# Patient Record
Sex: FEMALE | Race: ASIAN
[De-identification: names, ages, dates, MRNs, and addresses within clinical notes are randomized per-mention and may not be internally consistent; named-entity substitution may affect disease eponyms.]

---

## 2018-06-25 ENCOUNTER — HOSPITAL ENCOUNTER (EMERGENCY)
Dept: HOSPITAL 54 - ER | Age: 53
Discharge: HOME | End: 2018-06-25
Payer: COMMERCIAL

## 2018-06-25 VITALS — BODY MASS INDEX: 27.05 KG/M2 | HEIGHT: 62 IN | WEIGHT: 147 LBS

## 2018-06-25 VITALS — SYSTOLIC BLOOD PRESSURE: 137 MMHG | DIASTOLIC BLOOD PRESSURE: 78 MMHG

## 2018-06-25 DIAGNOSIS — F41.9: Primary | ICD-10-CM

## 2018-06-25 DIAGNOSIS — I10: ICD-10-CM

## 2018-06-25 DIAGNOSIS — R07.89: ICD-10-CM

## 2018-06-25 DIAGNOSIS — Z90.710: ICD-10-CM

## 2018-06-25 LAB
APTT PPP: 23 SEC (ref 23–34)
BASOPHILS # BLD AUTO: 0 /CMM (ref 0–0.2)
BASOPHILS NFR BLD AUTO: 0.6 % (ref 0–2)
BUN SERPL-MCNC: 14 MG/DL (ref 7–18)
CALCIUM SERPL-MCNC: 9.4 MG/DL (ref 8.5–10.1)
CHLORIDE SERPL-SCNC: 103 MMOL/L (ref 98–107)
CO2 SERPL-SCNC: 28 MMOL/L (ref 21–32)
CREAT SERPL-MCNC: 0.5 MG/DL (ref 0.6–1.3)
D DIMER PPP FEU-MCNC: 0.27 MG/L(FEU (ref 0.17–0.5)
EOSINOPHIL NFR BLD AUTO: 3.6 % (ref 0–6)
GLUCOSE SERPL-MCNC: 106 MG/DL (ref 74–106)
HCT VFR BLD AUTO: 39 % (ref 33–45)
HGB BLD-MCNC: 13.7 G/DL (ref 11.5–14.8)
INR PPP: 0.88 (ref 0.85–1.15)
LYMPHOCYTES NFR BLD AUTO: 1.6 /CMM (ref 0.8–4.8)
LYMPHOCYTES NFR BLD AUTO: 31.7 % (ref 20–44)
MCHC RBC AUTO-ENTMCNC: 35 G/DL (ref 31–36)
MCV RBC AUTO: 93 FL (ref 82–100)
MONOCYTES NFR BLD AUTO: 0.5 /CMM (ref 0.1–1.3)
MONOCYTES NFR BLD AUTO: 10.1 % (ref 2–12)
NEUTROPHILS # BLD AUTO: 2.7 /CMM (ref 1.8–8.9)
NEUTROPHILS NFR BLD AUTO: 54 % (ref 43–81)
PLATELET # BLD AUTO: 285 /CMM (ref 150–450)
POTASSIUM SERPL-SCNC: 3.3 MMOL/L (ref 3.5–5.1)
RBC # BLD AUTO: 4.17 MIL/UL (ref 4–5.2)
RDW COEFFICIENT OF VARIATION: 12 (ref 11.5–15)
SODIUM SERPL-SCNC: 138 MMOL/L (ref 136–145)
TROPONIN I SERPL-MCNC: < 0.017 NG/ML (ref 0–0.06)
WBC NRBC COR # BLD AUTO: 5 K/UL (ref 4.3–11)

## 2018-06-25 PROCEDURE — 80048 BASIC METABOLIC PNL TOTAL CA: CPT

## 2018-06-25 PROCEDURE — 36415 COLL VENOUS BLD VENIPUNCTURE: CPT

## 2018-06-25 PROCEDURE — 85378 FIBRIN DEGRADE SEMIQUANT: CPT

## 2018-06-25 PROCEDURE — 84484 ASSAY OF TROPONIN QUANT: CPT

## 2018-06-25 PROCEDURE — 71045 X-RAY EXAM CHEST 1 VIEW: CPT

## 2018-06-25 PROCEDURE — 85730 THROMBOPLASTIN TIME PARTIAL: CPT

## 2018-06-25 PROCEDURE — 99285 EMERGENCY DEPT VISIT HI MDM: CPT

## 2018-06-25 PROCEDURE — 85025 COMPLETE CBC W/AUTO DIFF WBC: CPT

## 2018-06-25 PROCEDURE — Z7610: HCPCS

## 2018-06-25 PROCEDURE — 93005 ELECTROCARDIOGRAM TRACING: CPT

## 2018-06-25 PROCEDURE — A4606 OXYGEN PROBE USED W OXIMETER: HCPCS

## 2018-11-13 ENCOUNTER — OFFICE (OUTPATIENT)
Dept: URBAN - METROPOLITAN AREA CLINIC 13 | Facility: CLINIC | Age: 53
End: 2018-11-13

## 2018-11-13 VITALS
WEIGHT: 144 LBS | HEART RATE: 66 BPM | DIASTOLIC BLOOD PRESSURE: 69 MMHG | SYSTOLIC BLOOD PRESSURE: 111 MMHG | HEIGHT: 59 IN

## 2018-11-13 DIAGNOSIS — K62.5 RECTAL BLEEDING: ICD-10-CM

## 2018-11-13 DIAGNOSIS — R94.5 ABNORMAL RESULTS OF LIVER FUNCTION STUDIES: ICD-10-CM

## 2018-11-13 DIAGNOSIS — R10.9 LEFT SIDED ABDOMINAL PAIN: ICD-10-CM

## 2018-11-13 DIAGNOSIS — R19.5: ICD-10-CM

## 2018-11-13 DIAGNOSIS — Z80.0 FAMILY HISTORY OF COLON CANCER- MOTHER: ICD-10-CM

## 2018-11-13 PROCEDURE — 99244 OFF/OP CNSLTJ NEW/EST MOD 40: CPT

## 2018-11-13 NOTE — SERVICEHPINOTES
Elisabet August has been experiencing left sided abdominal pain for 2 months.  She initially went to see her OBGYN.  She states that the evaluation including a pelvic ultrasound was normal.  She describes the pain as a throbbing pain that radiates downwards.  She does suffer from chronic lower back pain as well.  The abdominal pain was initially intermittent but now it is constant.  The pain level is about 5/10, but it can go up to 7/10.  She denies change in bowel habits but she does predominantly have loose stools.  She reports that she has loose stools depending on what she eats.  She has noticed a correlation with dairy products.  She states there has been no change in her bowel habits for years.  She has one bowel movement per day.  She was diagnosed with IBS years ago and her bowel habits have not changed in frequency or predictability.  She also reports seeing small amount of fresh blood on the toilet paper after bowel movements.  She denies nausea, vomiting, fever, or chills.  She denies weight loss.  She recently started taking Motrin for a toothache and noticed that the abdominal pain has improved this past week.  Pain is unrelated to bowel movements and eating.  She last underwent a colonoscopy in 2006 performed by Dr. Castro.  Internal hemorrhoids were found.  She was instructed to return in 5 years due to family history of colon cancer.  Her father passed away from colon cancer.  She reports that her mother passed away from cirrhosis, unsure why.  Her sister has hepatitis B.

## 2019-03-13 ENCOUNTER — AMBULATORY SURGICAL CENTER (OUTPATIENT)
Dept: URBAN - METROPOLITAN AREA SURGERY 6 | Facility: SURGERY | Age: 54
End: 2019-03-13

## 2019-03-13 VITALS
SYSTOLIC BLOOD PRESSURE: 126 MMHG | HEART RATE: 80 BPM | HEIGHT: 59 IN | TEMPERATURE: 97.9 F | DIASTOLIC BLOOD PRESSURE: 75 MMHG | OXYGEN SATURATION: 99 % | RESPIRATION RATE: 18 BRPM | WEIGHT: 141 LBS

## 2019-03-13 DIAGNOSIS — K64.2 THIRD DEGREE HEMORRHOIDS: ICD-10-CM

## 2019-03-13 PROCEDURE — 45378 DIAGNOSTIC COLONOSCOPY: CPT | Performed by: INTERNAL MEDICINE

## 2019-04-04 ENCOUNTER — HOSPITAL ENCOUNTER (EMERGENCY)
Dept: HOSPITAL 54 - ER | Age: 54
Discharge: HOME | End: 2019-04-04
Payer: COMMERCIAL

## 2019-04-04 VITALS — DIASTOLIC BLOOD PRESSURE: 62 MMHG | SYSTOLIC BLOOD PRESSURE: 108 MMHG

## 2019-04-04 VITALS — WEIGHT: 141 LBS | HEIGHT: 59 IN | BODY MASS INDEX: 28.43 KG/M2

## 2019-04-04 DIAGNOSIS — Z98.890: ICD-10-CM

## 2019-04-04 DIAGNOSIS — R07.89: Primary | ICD-10-CM

## 2019-04-04 DIAGNOSIS — Z90.711: ICD-10-CM

## 2019-04-04 DIAGNOSIS — Z79.899: ICD-10-CM

## 2019-04-04 DIAGNOSIS — I10: ICD-10-CM

## 2019-04-04 DIAGNOSIS — F41.9: ICD-10-CM

## 2019-04-04 DIAGNOSIS — K58.9: ICD-10-CM

## 2019-04-04 LAB
BASOPHILS # BLD AUTO: 0.1 /CMM (ref 0–0.2)
BASOPHILS NFR BLD AUTO: 1.2 % (ref 0–2)
BUN SERPL-MCNC: 19 MG/DL (ref 7–18)
CALCIUM SERPL-MCNC: 9.1 MG/DL (ref 8.5–10.1)
CHLORIDE SERPL-SCNC: 100 MMOL/L (ref 98–107)
CO2 SERPL-SCNC: 29 MMOL/L (ref 21–32)
CREAT SERPL-MCNC: 0.6 MG/DL (ref 0.6–1.3)
EOSINOPHIL NFR BLD AUTO: 3.6 % (ref 0–6)
GLUCOSE SERPL-MCNC: 103 MG/DL (ref 74–106)
HCT VFR BLD AUTO: 39 % (ref 33–45)
HGB BLD-MCNC: 13.3 G/DL (ref 11.5–14.8)
LYMPHOCYTES NFR BLD AUTO: 1.8 /CMM (ref 0.8–4.8)
LYMPHOCYTES NFR BLD AUTO: 34 % (ref 20–44)
MCHC RBC AUTO-ENTMCNC: 34 G/DL (ref 31–36)
MCV RBC AUTO: 95 FL (ref 82–100)
MONOCYTES NFR BLD AUTO: 0.4 /CMM (ref 0.1–1.3)
MONOCYTES NFR BLD AUTO: 7.9 % (ref 2–12)
NEUTROPHILS # BLD AUTO: 2.8 /CMM (ref 1.8–8.9)
NEUTROPHILS NFR BLD AUTO: 53.3 % (ref 43–81)
PLATELET # BLD AUTO: 286 /CMM (ref 150–450)
POTASSIUM SERPL-SCNC: 3.3 MMOL/L (ref 3.5–5.1)
RBC # BLD AUTO: 4.09 MIL/UL (ref 4–5.2)
SODIUM SERPL-SCNC: 136 MMOL/L (ref 136–145)
WBC NRBC COR # BLD AUTO: 5.2 K/UL (ref 4.3–11)

## 2019-04-04 PROCEDURE — 96374 THER/PROPH/DIAG INJ IV PUSH: CPT

## 2019-04-04 PROCEDURE — 99284 EMERGENCY DEPT VISIT MOD MDM: CPT

## 2019-04-04 PROCEDURE — 84484 ASSAY OF TROPONIN QUANT: CPT

## 2019-04-04 PROCEDURE — 36415 COLL VENOUS BLD VENIPUNCTURE: CPT

## 2019-04-04 PROCEDURE — 93005 ELECTROCARDIOGRAM TRACING: CPT

## 2019-04-04 PROCEDURE — 85025 COMPLETE CBC W/AUTO DIFF WBC: CPT

## 2019-04-04 PROCEDURE — 71045 X-RAY EXAM CHEST 1 VIEW: CPT

## 2019-04-04 PROCEDURE — 80048 BASIC METABOLIC PNL TOTAL CA: CPT

## 2019-04-04 NOTE — NUR
PT PRESENTED TO THE ER WITH A C/O LEFT SIDED CP THAT RADIATES TO THE LUE AND 
THE BACK. PT STATED THAT IT STARTED 3 DAYS AGO AND PT WAS AFRAID TO TAKE PAIN 
MEDICATION. PT CALLED HER PMD WHO INSTRUCTED HER TO GO TO THE ER. PT IS ON THE 
MONITOR AND CONTINUOUS PULSE OX. VSS. SR ON THE MONITOR .

## 2019-04-04 NOTE — NUR
IV removed. Catheter intact and site benign. Pressure and 4x4 applied to site. 
No bleeding noted. Patient discharged to home in stable condition. Written and 
verbal after care instructions given. Patient verbalizes understanding of 
instruction. PT REC'D A COPY OF ALL LABS, IMAGING FINDINGS AND EKG COPY. PT 
AMBULATED OUT WITH A STEADY GAIT. VSS. NAD NOTED. RESP EVEN AND UNLABORED. PT 
STATED THAT SHE STILL HAD PAIN, BUT IT WAS MINIMAL.

## 2019-08-31 ENCOUNTER — HOSPITAL ENCOUNTER (EMERGENCY)
Age: 54
Discharge: HOME | End: 2019-08-31
Payer: COMMERCIAL

## 2019-08-31 DIAGNOSIS — R42: ICD-10-CM

## 2019-08-31 DIAGNOSIS — Z90.710: ICD-10-CM

## 2019-08-31 DIAGNOSIS — I10: ICD-10-CM

## 2019-08-31 DIAGNOSIS — R53.1: Primary | ICD-10-CM

## 2019-08-31 DIAGNOSIS — Z98.890: ICD-10-CM

## 2019-08-31 DIAGNOSIS — F41.9: ICD-10-CM

## 2023-10-13 ENCOUNTER — OFFICE (OUTPATIENT)
Dept: URBAN - METROPOLITAN AREA CLINIC 13 | Facility: CLINIC | Age: 58
End: 2023-10-13

## 2023-10-13 VITALS
WEIGHT: 140.8 LBS | HEIGHT: 59 IN | TEMPERATURE: 98.2 F | HEART RATE: 66 BPM | DIASTOLIC BLOOD PRESSURE: 77 MMHG | SYSTOLIC BLOOD PRESSURE: 119 MMHG

## 2023-10-13 DIAGNOSIS — R94.5 ABNORMAL RESULTS OF LIVER FUNCTION STUDIES: ICD-10-CM

## 2023-10-13 PROCEDURE — 99203 OFFICE O/P NEW LOW 30 MIN: CPT | Performed by: INTERNAL MEDICINE

## 2024-08-14 ENCOUNTER — OFFICE (OUTPATIENT)
Dept: URBAN - METROPOLITAN AREA CLINIC 13 | Facility: CLINIC | Age: 59
End: 2024-08-14

## 2024-08-14 VITALS
WEIGHT: 140 LBS | TEMPERATURE: 97.4 F | HEART RATE: 72 BPM | SYSTOLIC BLOOD PRESSURE: 115 MMHG | HEIGHT: 59 IN | DIASTOLIC BLOOD PRESSURE: 71 MMHG

## 2024-08-14 DIAGNOSIS — K76.0 HEPATIC STEATOSIS: ICD-10-CM

## 2024-08-14 DIAGNOSIS — Z80.0: ICD-10-CM

## 2024-08-14 DIAGNOSIS — R10.9 ABDOMINAL DISCOMFORT: ICD-10-CM

## 2024-08-14 DIAGNOSIS — R14.0 ABDOMINAL BLOATING/GAS: ICD-10-CM

## 2024-08-14 DIAGNOSIS — K64.2 THIRD DEGREE HEMORRHOIDS: ICD-10-CM

## 2024-08-14 PROCEDURE — 99214 OFFICE O/P EST MOD 30 MIN: CPT

## 2024-08-14 NOTE — SERVICEHPINOTES
SATLY MONGE   returns today for follow-up from last visit on   10/13/2023  .    
br  For several months, she has had bloating on a daily basis. She does notice more flatulence. Symptoms occur regardless of what she eats &amp she has not identified any specific dietary triggers. She uses straws &amp drinks Triston frequently.
brWhen asked about heartburn, she states that she does very occasionally have left-sided chest discomfort that she attributes to GERD. She reports a negative cardiac workup prior to the pandemic. 
br She has a normal BM daily to every other day. She has intermittent BRBPR on toilet tissue which she attributes to hemorrhoids. She continues to have intermittent LLQ discomfort which she thinks is d/t an ovarian cyst.
br She report weight gain. She has a good appetite w/ a regular diet. She has about 3 EtOH drinks monthly &amp denies smoking. She takes Ibuprofen PRN for back pain.
br At this time, she denies dysphagia, n/v, unintended weight loss, jaundice, SOB, chest pain, fever, or chills.
robertHer father is  from colon CA. Her mother is  from cirrhosis (non-alcoholic).

## 2024-11-06 ENCOUNTER — AMBULATORY SURGICAL CENTER (OUTPATIENT)
Dept: URBAN - METROPOLITAN AREA SURGERY 6 | Facility: SURGERY | Age: 59
End: 2024-11-06

## 2024-11-06 VITALS
HEIGHT: 59 IN | TEMPERATURE: 97.3 F | OXYGEN SATURATION: 100 % | WEIGHT: 144 LBS | DIASTOLIC BLOOD PRESSURE: 67 MMHG | SYSTOLIC BLOOD PRESSURE: 114 MMHG | HEART RATE: 66 BPM | RESPIRATION RATE: 15 BRPM

## 2024-11-06 DIAGNOSIS — D12.5 BENIGN NEOPLASM OF SIGMOID COLON: ICD-10-CM

## 2024-11-06 DIAGNOSIS — K62.5 HEMORRHAGE OF ANUS AND RECTUM: ICD-10-CM

## 2024-11-06 DIAGNOSIS — K64.1 SECOND DEGREE HEMORRHOIDS: ICD-10-CM

## 2024-11-06 PROCEDURE — 45385 COLONOSCOPY W/LESION REMOVAL: CPT | Performed by: INTERNAL MEDICINE

## 2024-11-08 LAB — PATHOLOGY REPORT: (no result)

## 2025-01-10 ENCOUNTER — OFFICE (OUTPATIENT)
Dept: URBAN - METROPOLITAN AREA CLINIC 13 | Facility: CLINIC | Age: 60
End: 2025-01-10

## 2025-01-10 VITALS — HEIGHT: 59 IN

## 2025-01-10 DIAGNOSIS — Z80.0: ICD-10-CM

## 2025-01-10 DIAGNOSIS — K63.5 POLYP OF COLON: ICD-10-CM

## 2025-01-10 PROCEDURE — G0406 INPT/TELE FOLLOW UP 15: HCPCS | Performed by: INTERNAL MEDICINE

## 2025-01-10 PROCEDURE — 99213 OFFICE O/P EST LOW 20 MIN: CPT | Performed by: INTERNAL MEDICINE

## 2025-01-10 NOTE — SERVICEHPINOTES
Elisabet is a 59-year-old female presenting for a follow-up visit to discuss recent colonoscopy results. She underwent a colonoscopy on November 6th, which revealed a small polyp and hemorrhoids. She reports experiencing occasional rectal bleeding. A previous colonoscopy in 2019 was unremarkable, showing no polyps but did note hemorrhoids. Patient has a significant family history of colon cancer her father was diagnosed at age 74 and passed away from the disease in 2002.Past Diagnostic Results:br- Colonoscopy (November 6th): Revealed a small polyp and hemorrhoids. Pathology report identified the polyp as a tubular adenoma.br- Colonoscopy (2019): No polyps were found, but hemorrhoids were noted.